# Patient Record
Sex: MALE | Race: WHITE | Employment: FULL TIME | URBAN - METROPOLITAN AREA
[De-identification: names, ages, dates, MRNs, and addresses within clinical notes are randomized per-mention and may not be internally consistent; named-entity substitution may affect disease eponyms.]

---

## 2018-09-03 ENCOUNTER — HOSPITAL ENCOUNTER (EMERGENCY)
Age: 48
Discharge: HOME | End: 2018-09-03
Payer: COMMERCIAL

## 2018-09-03 VITALS
TEMPERATURE: 98 F | HEIGHT: 70 IN | SYSTOLIC BLOOD PRESSURE: 135 MMHG | RESPIRATION RATE: 16 BRPM | HEART RATE: 82 BPM | BODY MASS INDEX: 30.78 KG/M2 | WEIGHT: 215 LBS | OXYGEN SATURATION: 96 % | DIASTOLIC BLOOD PRESSURE: 74 MMHG

## 2018-09-03 DIAGNOSIS — S39.012A BACK STRAIN, INITIAL ENCOUNTER: Primary | ICD-10-CM

## 2018-09-03 PROCEDURE — 96372 THER/PROPH/DIAG INJ SC/IM: CPT | Performed by: NURSE PRACTITIONER

## 2018-09-03 PROCEDURE — 99283 EMERGENCY DEPT VISIT LOW MDM: CPT | Performed by: NURSE PRACTITIONER

## 2018-09-03 RX ORDER — NAPROXEN 500 MG/1
500 TABLET ORAL 2 TIMES DAILY PRN
Qty: 20 TABLET | Refills: 0 | Status: SHIPPED | OUTPATIENT
Start: 2018-09-03 | End: 2018-09-10

## 2018-09-03 RX ORDER — METHOCARBAMOL 500 MG/1
500 TABLET, FILM COATED ORAL 3 TIMES DAILY PRN
Qty: 15 TABLET | Refills: 0 | Status: SHIPPED | OUTPATIENT
Start: 2018-09-03 | End: 2019-11-15

## 2018-09-03 RX ORDER — KETOROLAC TROMETHAMINE 30 MG/ML
60 INJECTION, SOLUTION INTRAMUSCULAR; INTRAVENOUS ONCE
Status: COMPLETED | OUTPATIENT
Start: 2018-09-03 | End: 2018-09-03

## 2018-09-03 NOTE — ED PROVIDER NOTES
Patient Seen in: [facility] [place]    History   Patient presents with:  Back Pain (musculoskeletal)    Stated Complaint: lower back pain    HPI  Patient is a 26-year-old gentleman that presents with right lumbar discomfort last ev Pulmonary/Chest: Effort normal.   Abdominal: Soft. Bowel sounds are normal. He exhibits no distension and no mass. There is no tenderness. There is no rebound and no guarding.    Musculoskeletal:    Back / Hip exam  Inspection: No ecchymosis, swelling, er Tab  Take 1 tablet (500 mg total) by mouth 3 (three) times daily as needed (back spasm). , Normal, Disp-15 tablet, R-0    naproxen 500 MG Oral Tab  Take 1 tablet (500 mg total) by mouth 2 (two) times daily as needed. , Print Script, Disp-20 tablet, R-0

## 2018-09-03 NOTE — ED INITIAL ASSESSMENT (HPI)
Pt was lifting heavy speakers on Saturday, c/o lower back pain since  Ibuprofen and cyclobenzaprine taken yesterday with minimal relief

## 2020-02-14 ENCOUNTER — HOSPITAL ENCOUNTER (OUTPATIENT)
Dept: RESPIRATORY THERAPY | Age: 50
Discharge: HOME | End: 2020-02-14
Attending: INTERNAL MEDICINE

## 2020-02-14 PROCEDURE — 95012 NITRIC OXIDE EXP GAS DETER: CPT

## 2020-02-14 PROCEDURE — 94070 EVALUATION OF WHEEZING: CPT

## 2020-02-14 RX ORDER — ALBUTEROL SULFATE 2.5 MG/3ML
SOLUTION RESPIRATORY (INHALATION)
Status: DISCONTINUED
Start: 2020-02-14 | End: 2020-02-15 | Stop reason: HOSPADM

## 2021-12-20 ENCOUNTER — LAB SERVICES (OUTPATIENT)
Dept: URGENT CARE | Age: 51
End: 2021-12-20

## 2021-12-20 DIAGNOSIS — Z01.812 ENCOUNTER FOR SCREENING LABORATORY TESTING FOR COVID-19 VIRUS IN ASYMPTOMATIC PATIENT: Primary | ICD-10-CM

## 2021-12-20 DIAGNOSIS — Z11.52 ENCOUNTER FOR SCREENING LABORATORY TESTING FOR COVID-19 VIRUS IN ASYMPTOMATIC PATIENT: Primary | ICD-10-CM

## 2021-12-20 PROCEDURE — U0003 INFECTIOUS AGENT DETECTION BY NUCLEIC ACID (DNA OR RNA); SEVERE ACUTE RESPIRATORY SYNDROME CORONAVIRUS 2 (SARS-COV-2) (CORONAVIRUS DISEASE [COVID-19]), AMPLIFIED PROBE TECHNIQUE, MAKING USE OF HIGH THROUGHPUT TECHNOLOGIES AS DESCRIBED BY CMS-2020-01-R: HCPCS | Performed by: PSYCHIATRY & NEUROLOGY

## 2021-12-20 PROCEDURE — U0005 INFEC AGEN DETEC AMPLI PROBE: HCPCS | Performed by: PSYCHIATRY & NEUROLOGY

## 2021-12-21 LAB
SARS-COV-2 RNA RESP QL NAA+PROBE: NOT DETECTED
SERVICE CMNT-IMP: NORMAL
SERVICE CMNT-IMP: NORMAL

## 2023-10-10 ENCOUNTER — WALK IN (OUTPATIENT)
Dept: URGENT CARE | Age: 53
End: 2023-10-10

## 2023-10-10 VITALS
OXYGEN SATURATION: 100 % | BODY MASS INDEX: 33.64 KG/M2 | DIASTOLIC BLOOD PRESSURE: 84 MMHG | HEART RATE: 80 BPM | HEIGHT: 70 IN | WEIGHT: 235 LBS | SYSTOLIC BLOOD PRESSURE: 138 MMHG | TEMPERATURE: 97.9 F

## 2023-10-10 DIAGNOSIS — H10.13 ACUTE ALLERGIC CONJUNCTIVITIS OF BOTH EYES: ICD-10-CM

## 2023-10-10 DIAGNOSIS — H10.9 BACTERIAL CONJUNCTIVITIS: Primary | ICD-10-CM

## 2023-10-10 PROCEDURE — 99203 OFFICE O/P NEW LOW 30 MIN: CPT | Performed by: NURSE PRACTITIONER

## 2023-10-10 RX ORDER — ROSUVASTATIN CALCIUM 5 MG/1
TABLET, COATED ORAL
COMMUNITY
Start: 2023-08-31

## 2023-10-10 RX ORDER — DULOXETIN HYDROCHLORIDE 60 MG/1
CAPSULE, DELAYED RELEASE ORAL
COMMUNITY
Start: 2023-08-31

## 2023-10-10 RX ORDER — OLOPATADINE HYDROCHLORIDE 1 MG/ML
1 SOLUTION/ DROPS OPHTHALMIC 2 TIMES DAILY
Qty: 5 ML | Refills: 12 | Status: SHIPPED | OUTPATIENT
Start: 2023-10-10 | End: 2023-11-09

## 2023-10-10 RX ORDER — POLYMYXIN B SULFATE AND TRIMETHOPRIM 1; 10000 MG/ML; [USP'U]/ML
1 SOLUTION OPHTHALMIC EVERY 4 HOURS
Qty: 10 ML | Refills: 0 | Status: SHIPPED | OUTPATIENT
Start: 2023-10-10 | End: 2023-10-17

## 2023-10-10 RX ORDER — CETIRIZINE HYDROCHLORIDE 10 MG/1
10 TABLET ORAL DAILY
COMMUNITY

## 2024-04-25 ENCOUNTER — OFFICE VISIT (OUTPATIENT)
Dept: INTERNAL MEDICINE CLINIC | Facility: CLINIC | Age: 54
End: 2024-04-25
Payer: COMMERCIAL

## 2024-04-25 VITALS
SYSTOLIC BLOOD PRESSURE: 132 MMHG | HEIGHT: 69.49 IN | BODY MASS INDEX: 34.6 KG/M2 | HEART RATE: 94 BPM | OXYGEN SATURATION: 96 % | RESPIRATION RATE: 18 BRPM | WEIGHT: 239 LBS | TEMPERATURE: 97 F | DIASTOLIC BLOOD PRESSURE: 86 MMHG

## 2024-04-25 DIAGNOSIS — Z13.29 THYROID DISORDER SCREEN: ICD-10-CM

## 2024-04-25 DIAGNOSIS — E66.9 OBESITY (BMI 30-39.9): ICD-10-CM

## 2024-04-25 DIAGNOSIS — Z12.5 PROSTATE CANCER SCREENING: ICD-10-CM

## 2024-04-25 DIAGNOSIS — F41.9 ANXIETY: ICD-10-CM

## 2024-04-25 DIAGNOSIS — E78.00 HYPERCHOLESTEROLEMIA: Primary | ICD-10-CM

## 2024-04-25 DIAGNOSIS — Z23 NEED FOR VACCINATION: ICD-10-CM

## 2024-04-25 DIAGNOSIS — Z13.0 SCREENING FOR DEFICIENCY ANEMIA: ICD-10-CM

## 2024-04-25 DIAGNOSIS — Z13.228 SCREENING FOR METABOLIC DISORDER: ICD-10-CM

## 2024-04-25 DIAGNOSIS — Z13.21 ENCOUNTER FOR VITAMIN DEFICIENCY SCREENING: ICD-10-CM

## 2024-04-25 PROCEDURE — 96127 BRIEF EMOTIONAL/BEHAV ASSMT: CPT | Performed by: FAMILY MEDICINE

## 2024-04-25 PROCEDURE — 90750 HZV VACC RECOMBINANT IM: CPT | Performed by: FAMILY MEDICINE

## 2024-04-25 PROCEDURE — 99203 OFFICE O/P NEW LOW 30 MIN: CPT | Performed by: FAMILY MEDICINE

## 2024-04-25 PROCEDURE — 90471 IMMUNIZATION ADMIN: CPT | Performed by: FAMILY MEDICINE

## 2024-04-25 RX ORDER — ROSUVASTATIN CALCIUM 5 MG/1
5 TABLET, COATED ORAL NIGHTLY
Qty: 90 TABLET | Refills: 3 | Status: SHIPPED | OUTPATIENT
Start: 2024-04-25

## 2024-04-25 RX ORDER — DULOXETIN HYDROCHLORIDE 60 MG/1
60 CAPSULE, DELAYED RELEASE ORAL DAILY
Qty: 90 CAPSULE | Refills: 0 | Status: SHIPPED | OUTPATIENT
Start: 2024-04-25

## 2024-04-25 RX ORDER — ROSUVASTATIN CALCIUM 5 MG/1
TABLET, COATED ORAL
COMMUNITY
Start: 2023-01-18 | End: 2024-04-25

## 2024-04-25 NOTE — PROGRESS NOTES
Jean Pierre Toro  8/21/1970    Chief Complaint   Patient presents with    New Patient    [facility]              HPI:   Jean Pierre Toro is a 53 year old male who presents to [facility]. He is on Crestor 5 mg nightly for HLD and Duloxetine for anxiety. He is engaged and currently looking into purchasing a home. He works as a  and also plays drums. He has been working to cut back on the amount of alcohol he drinks because of his father's history.     Current Outpatient Medications   Medication Sig Dispense Refill    DULoxetine 60 MG Oral Cap DR Particles Take 1 capsule (60 mg total) by mouth daily. 90 capsule 0    rosuvastatin 5 MG Oral Tab Take 1 tablet (5 mg total) by mouth nightly. 90 tablet 3    Albuterol Sulfate (PROAIR RESPICLICK) 108 (90 Base) MCG/ACT Inhalation Aerosol Powder, Breath Activated Inhale 2 puffs into the lungs every 4 to 6 hours as needed. 1 each 5      Allergies   Allergen Reactions    Seasonal Runny nose    Uncaria Tomentosa, Cats Claw ITCHING      Past Medical History:    Chronic pansinusitis    Esophageal reflux    Gastroesophageal reflux disease without esophagitis    History of colon polyps    on 4 colonoscopies    Hypercholesterolemia    Obstructive apnea    [facility] HST AHI 6 supine  AHI 22 SaO2 moise 85 %    Sleep apnea    Venous insufficiency of left lower extremity, deep on U/S      Patient Active Problem List   Diagnosis    Obesity (BMI 30-39.9)    Hypercholesterolemia    Gastroesophageal reflux disease without esophagitis    MARTA (obstructive sleep apnea), PSG 12/2019    Anxiety    Chronic pansinusitis    Venous insufficiency of left lower extremity, deep on U/S      Past Surgical History:   Procedure Laterality Date    Colonoscopy  2018    +polyps    Egd N/A 12/15/2021    Procedure: ESOPHAGOGASTRODUODENOSCOPY, COLONOSCOPY,;  Surgeon: Vinicio Victoria MD;  Location: [facility], [facility]      Family History   Problem Relation Age of Onset    Other (brain aneurysm)  Mother     Dementia Mother     Stroke Mother     Neurological Disorder Father     Alcohol abuse Father     Colon Cancer Maternal Grandfather     Colon Cancer Maternal Aunt 36    Other (brain aneurysm) Maternal Aunt       Social History     Socioeconomic History    Marital status: Single   Tobacco Use    Smoking status: Never    Smokeless tobacco: Never   Vaping Use    Vaping status: Never Used   Substance and Sexual Activity    Alcohol use: Yes     Alcohol/week: 3.0 - 4.0 standard drinks of alcohol     Types: 3 - 4 Cans of beer per week    Drug use: Yes     Frequency: 2.0 times per week     Types: Cannabis         REVIEW OF SYSTEMS:   GENERAL: feels well otherwise, no recent illness.     EXAM:   /86   Pulse 94   Temp 97.4 °F (36.3 °C)   Resp 18   Ht 5' 9.49\" (1.765 m)   Wt 239 lb (108.4 kg)   SpO2 96%   BMI 34.80 kg/m²   GENERAL: Well developed, well nourished,in no apparent distress  SKIN: No rash  EYES: PERRLA, EOMI, conjunctiva are clear  HEENT: atraumatic, normocephalic  LUNGS: normal work of breathing  CARDIO: Regular rate    ASSESSMENT AND PLAN:   Jean Pierre Toro is a 53 year old male who presents to establish care    Hypercholesterolemia  Continue statin. Will check lipid panel.   - Lipid Panel; Future  - rosuvastatin 5 MG Oral Tab; Take 1 tablet (5 mg total) by mouth nightly.  Dispense: 90 tablet; Refill: 3    Anxiety  Overall controlled on treatment. Considering tapering off at some point.   - DULoxetine 60 MG Oral Cap DR Particles; Take 1 capsule (60 mg total) by mouth daily.  Dispense: 90 capsule; Refill: 0    Need for vaccination  - Zoster Recombinant Adjuvanted (Shingrix -Shingles) [04634]    Screening Labs  - Comp Metabolic Panel (14); Future  - Vitamin D, 25-Hydroxy; Future  - PSA Screen; Future  - CBC With Differential With Platelet; Future  - TSH W Reflex To Free T4; Future    Return in 4 weeks (on 5/23/2024).  There are no Patient Instructions on file for this visit.    All questions  were answered and the patient agrees with the plan.     Thank you,  Jean Pierre Lord MD

## 2024-05-07 ENCOUNTER — LAB ENCOUNTER (OUTPATIENT)
Dept: LAB | Age: 54
End: 2024-05-07
Attending: FAMILY MEDICINE
Payer: COMMERCIAL

## 2024-05-07 DIAGNOSIS — Z12.5 PROSTATE CANCER SCREENING: ICD-10-CM

## 2024-05-07 DIAGNOSIS — E78.00 HYPERCHOLESTEROLEMIA: ICD-10-CM

## 2024-05-07 DIAGNOSIS — Z13.228 SCREENING FOR METABOLIC DISORDER: ICD-10-CM

## 2024-05-07 DIAGNOSIS — Z13.29 THYROID DISORDER SCREEN: ICD-10-CM

## 2024-05-07 DIAGNOSIS — Z13.21 ENCOUNTER FOR VITAMIN DEFICIENCY SCREENING: ICD-10-CM

## 2024-05-07 DIAGNOSIS — Z13.0 SCREENING FOR DEFICIENCY ANEMIA: ICD-10-CM

## 2024-05-07 LAB
ALBUMIN SERPL-MCNC: 4.6 G/DL (ref 3.2–4.8)
ALBUMIN/GLOB SERPL: 1.6 {RATIO} (ref 1–2)
ALP LIVER SERPL-CCNC: 50 U/L
ALT SERPL-CCNC: 26 U/L
ANION GAP SERPL CALC-SCNC: 5 MMOL/L (ref 0–18)
AST SERPL-CCNC: 24 U/L (ref ?–34)
BASOPHILS # BLD AUTO: 0.04 X10(3) UL (ref 0–0.2)
BASOPHILS NFR BLD AUTO: 0.8 %
BILIRUB SERPL-MCNC: 0.8 MG/DL (ref 0.3–1.2)
BUN BLD-MCNC: 11 MG/DL (ref 9–23)
BUN/CREAT SERPL: 9.7 (ref 10–20)
CALCIUM BLD-MCNC: 9.8 MG/DL (ref 8.7–10.4)
CHLORIDE SERPL-SCNC: 105 MMOL/L (ref 98–112)
CHOLEST SERPL-MCNC: 183 MG/DL (ref ?–200)
CO2 SERPL-SCNC: 29 MMOL/L (ref 21–32)
COMPLEXED PSA SERPL-MCNC: 0.99 NG/ML (ref ?–4)
CREAT BLD-MCNC: 1.13 MG/DL
DEPRECATED RDW RBC AUTO: 42.4 FL (ref 35.1–46.3)
EGFRCR SERPLBLD CKD-EPI 2021: 78 ML/MIN/1.73M2 (ref 60–?)
EOSINOPHIL # BLD AUTO: 0.2 X10(3) UL (ref 0–0.7)
EOSINOPHIL NFR BLD AUTO: 4.2 %
ERYTHROCYTE [DISTWIDTH] IN BLOOD BY AUTOMATED COUNT: 12.5 % (ref 11–15)
FASTING PATIENT LIPID ANSWER: YES
FASTING STATUS PATIENT QL REPORTED: YES
GLOBULIN PLAS-MCNC: 2.8 G/DL (ref 2–3.5)
GLUCOSE BLD-MCNC: 101 MG/DL (ref 70–99)
HCT VFR BLD AUTO: 46.3 %
HDLC SERPL-MCNC: 56 MG/DL (ref 40–59)
HGB BLD-MCNC: 15.8 G/DL
IMM GRANULOCYTES # BLD AUTO: 0.02 X10(3) UL (ref 0–1)
IMM GRANULOCYTES NFR BLD: 0.4 %
LDLC SERPL CALC-MCNC: 106 MG/DL (ref ?–100)
LYMPHOCYTES # BLD AUTO: 1.18 X10(3) UL (ref 1–4)
LYMPHOCYTES NFR BLD AUTO: 25 %
MCH RBC QN AUTO: 31.7 PG (ref 26–34)
MCHC RBC AUTO-ENTMCNC: 34.1 G/DL (ref 31–37)
MCV RBC AUTO: 93 FL
MONOCYTES # BLD AUTO: 0.36 X10(3) UL (ref 0.1–1)
MONOCYTES NFR BLD AUTO: 7.6 %
NEUTROPHILS # BLD AUTO: 2.92 X10 (3) UL (ref 1.5–7.7)
NEUTROPHILS # BLD AUTO: 2.92 X10(3) UL (ref 1.5–7.7)
NEUTROPHILS NFR BLD AUTO: 62 %
NONHDLC SERPL-MCNC: 127 MG/DL (ref ?–130)
OSMOLALITY SERPL CALC.SUM OF ELEC: 288 MOSM/KG (ref 275–295)
PLATELET # BLD AUTO: 250 10(3)UL (ref 150–450)
POTASSIUM SERPL-SCNC: 4.9 MMOL/L (ref 3.5–5.1)
PROT SERPL-MCNC: 7.4 G/DL (ref 5.7–8.2)
RBC # BLD AUTO: 4.98 X10(6)UL
SODIUM SERPL-SCNC: 139 MMOL/L (ref 136–145)
TRIGL SERPL-MCNC: 119 MG/DL (ref 30–149)
TSI SER-ACNC: 1.95 MIU/ML (ref 0.55–4.78)
VIT D+METAB SERPL-MCNC: 19.9 NG/ML (ref 30–100)
VLDLC SERPL CALC-MCNC: 20 MG/DL (ref 0–30)
WBC # BLD AUTO: 4.7 X10(3) UL (ref 4–11)

## 2024-05-07 PROCEDURE — 80061 LIPID PANEL: CPT

## 2024-05-07 PROCEDURE — 82306 VITAMIN D 25 HYDROXY: CPT

## 2024-05-07 PROCEDURE — 85025 COMPLETE CBC W/AUTO DIFF WBC: CPT

## 2024-05-07 PROCEDURE — 80053 COMPREHEN METABOLIC PANEL: CPT

## 2024-05-07 PROCEDURE — 36415 COLL VENOUS BLD VENIPUNCTURE: CPT

## 2024-05-07 PROCEDURE — 84443 ASSAY THYROID STIM HORMONE: CPT

## 2024-05-09 ENCOUNTER — OFFICE VISIT (OUTPATIENT)
Dept: INTERNAL MEDICINE CLINIC | Facility: CLINIC | Age: 54
End: 2024-05-09
Payer: COMMERCIAL

## 2024-05-09 VITALS
TEMPERATURE: 98 F | HEIGHT: 69.29 IN | OXYGEN SATURATION: 96 % | RESPIRATION RATE: 16 BRPM | WEIGHT: 240.38 LBS | SYSTOLIC BLOOD PRESSURE: 126 MMHG | HEART RATE: 92 BPM | DIASTOLIC BLOOD PRESSURE: 74 MMHG | BODY MASS INDEX: 35.2 KG/M2

## 2024-05-09 DIAGNOSIS — E55.9 VITAMIN D DEFICIENCY: ICD-10-CM

## 2024-05-09 DIAGNOSIS — F41.9 ANXIETY: ICD-10-CM

## 2024-05-09 DIAGNOSIS — E66.9 OBESITY (BMI 30-39.9): ICD-10-CM

## 2024-05-09 DIAGNOSIS — Z00.00 WELLNESS EXAMINATION: Primary | ICD-10-CM

## 2024-05-09 DIAGNOSIS — E78.00 HYPERCHOLESTEROLEMIA: ICD-10-CM

## 2024-05-09 PROCEDURE — 99396 PREV VISIT EST AGE 40-64: CPT | Performed by: FAMILY MEDICINE

## 2024-05-09 RX ORDER — ERGOCALCIFEROL 1.25 MG/1
50000 CAPSULE ORAL WEEKLY
Qty: 12 CAPSULE | Refills: 0 | Status: SHIPPED | OUTPATIENT
Start: 2024-05-09

## 2024-05-09 NOTE — PROGRESS NOTES
Jean Pierre Toro  8/21/1970    Chief Complaint   Patient presents with    Physical     ES rm - 8 - Physical       HPI:   Jean Pierre Toro is a 53 year old male who presents for CPE.    Current Outpatient Medications   Medication Sig Dispense Refill    ergocalciferol 1.25 MG (83192 UT) Oral Cap Take 1 capsule (50,000 Units total) by mouth once a week. 12 capsule 0    DULoxetine 60 MG Oral Cap DR Particles Take 1 capsule (60 mg total) by mouth daily. 90 capsule 0    rosuvastatin 5 MG Oral Tab Take 1 tablet (5 mg total) by mouth nightly. 90 tablet 3    Albuterol Sulfate (PROAIR RESPICLICK) 108 (90 Base) MCG/ACT Inhalation Aerosol Powder, Breath Activated Inhale 2 puffs into the lungs every 4 to 6 hours as needed. 1 each 5      Allergies   Allergen Reactions    Seasonal Runny nose    Uncaria Tomentosa, Cats Claw ITCHING      Past Medical History:    Chronic pansinusitis    Esophageal reflux    Gastroesophageal reflux disease without esophagitis    History of colon polyps    on 4 colonoscopies    Hypercholesterolemia    Obstructive apnea    [facility] HST AHI 6 supine  AHI 22 SaO2 moise 85 %    Sleep apnea    Venous insufficiency of left lower extremity, deep on U/S      Patient Active Problem List   Diagnosis    Obesity (BMI 30-39.9)    Hypercholesterolemia    Gastroesophageal reflux disease without esophagitis    MARTA (obstructive sleep apnea), PSG 12/2019    Anxiety    Chronic pansinusitis    Venous insufficiency of left lower extremity, deep on U/S      Past Surgical History:   Procedure Laterality Date    Colonoscopy  2018    +polyps    Egd N/A 12/15/2021    Procedure: ESOPHAGOGASTRODUODENOSCOPY, COLONOSCOPY,;  Surgeon: Vinicio Victoria MD;  Location: [facility], [facility]      Family History   Problem Relation Age of Onset    Other (brain aneurysm) Mother     Dementia Mother     Stroke Mother     Neurological Disorder Father     Alcohol abuse Father     Colon Cancer Maternal Grandfather     Colon Cancer Maternal Aunt 36     Other (brain aneurysm) Maternal Aunt       Social History     Socioeconomic History    Marital status: Single   Tobacco Use    Smoking status: Never    Smokeless tobacco: Never   Vaping Use    Vaping status: Never Used   Substance and Sexual Activity    Alcohol use: Yes     Alcohol/week: 3.0 - 4.0 standard drinks of alcohol     Types: 3 - 4 Cans of beer per week    Drug use: Yes     Frequency: 2.0 times per week     Types: Cannabis         REVIEW OF SYSTEMS:   GENERAL: feels well otherwise  SKIN: no rash  EYES: no vision changes  HEENT: not congested  LUNGS: no dyspnea  CARDIOVASCULAR: no angina   GI: no new abdominal pain    EXAM:   /74 (BP Location: Left arm, Patient Position: Sitting, Cuff Size: large)   Pulse 92   Temp 97.6 °F (36.4 °C) (Temporal)   Resp 16   Ht 5' 9.29\" (1.76 m)   Wt 240 lb 6.4 oz (109 kg)   SpO2 96%   BMI 35.20 kg/m²   GENERAL: Well developed, well nourished,in no apparent distress  SKIN: No rash  EYES: PERRLA, EOMI, conjunctiva are clear  HEENT: atraumatic, normocephalic  LUNGS: clear to auscultation  CARDIO: RRR without murmur  GI: soft, NT    ASSESSMENT AND PLAN:   Jean Pierre Toro is a 53 year old male who presents for CPE    Wellness examination  Discussed age appropriate health and wellness.     Hypercholesterolemia  Obesity  Will continue current statin dosing and discussed DASH diet. Will repeat lipid panel prior to follow-up in 6 months.   - Lipid Panel; Future    Anxiety  Overall stable on current treatment. Would like to continue for now and readdress at his next follow-up    Vitamin D deficiency  Will begin 12 weeks of high dose therapy followed by daily D3 supplementation. Repeat vit D level in 6 months prior to follow-up  - Vitamin D [E]; Future  - ergocalciferol 1.25 MG (48425 UT) Oral Cap; Take 1 capsule (50,000 Units total) by mouth once a week.  Dispense: 12 capsule; Refill: 0      Return in 6 months (on 11/9/2024).  There are no Patient Instructions on file for  this visit.    All questions were answered and the patient agrees with the plan.     Thank you,  Jean Pierre Lord MD

## 2024-07-04 DIAGNOSIS — F41.9 ANXIETY: ICD-10-CM

## 2024-07-09 RX ORDER — DULOXETIN HYDROCHLORIDE 60 MG/1
60 CAPSULE, DELAYED RELEASE ORAL DAILY
Qty: 90 CAPSULE | Refills: 3 | Status: SHIPPED | OUTPATIENT
Start: 2024-07-09

## 2024-07-09 NOTE — TELEPHONE ENCOUNTER
Refill Per Protocol     Requested Prescriptions   Pending Prescriptions Disp Refills    DULOXETINE 60 MG Oral Cap DR Particles [Pharmacy Med Name: DULOXETINE HCL DR CAPS 60MG] 90 capsule 3     Sig: TAKE 1 CAPSULE DAILY       Psychiatric Non-Scheduled (Anti-Anxiety) Passed - 7/4/2024 11:48 PM        Passed - In person appointment or virtual visit in the past 6 mos or appointment in next 3 mos     Recent Outpatient Visits              2 months ago [address] examination    [address]    Office Visit    2 months ago Hypercholesterolemia    [address]    Office Visit    2 years ago Annual physical exam    [facility] - [address], [place], Marcin, MD    Office Visit    2 years ago Family history of colon cancer    [facility]ology - [place], Vinicio Marcial MD    Office Visit    2 years ago Fever, unspecified fever cause    [facility] [address], Blake Rojas DO    Office Visit          Future Appointments         Provider Department Appt Notes    In 4 months Jean Pierre Lord MD [address] 6 mo fup                    Passed - Depression Screening completed within the past 12 months               Future Appointments         Provider Department Appt Notes    In 4 months Jean Pierre Lord MD [address] 6 mo fup          Recent Outpatient Visits              2 months ago Wellness examination    [address]    Office Visit    2 months ago Hypercholesterolemia    [address]    Office Visit    2 years ago Annual physical exam    [facility] - [address], [place], Marcin, MD    Office Visit    2 years ago Family history of colon cancer     [facility] - [place] , Vinicio Marcial MD    Office Visit    2 years ago Fever, unspecified fever cause    [facility] [address], Blake Rojas, DO    Office Visit

## 2024-07-25 DIAGNOSIS — E55.9 VITAMIN D DEFICIENCY: ICD-10-CM

## 2024-07-29 RX ORDER — ERGOCALCIFEROL 1.25 MG/1
50000 CAPSULE, LIQUID FILLED ORAL WEEKLY
Qty: 12 CAPSULE | Refills: 0 | OUTPATIENT
Start: 2024-07-29

## 2024-07-29 NOTE — TELEPHONE ENCOUNTER
Please review. Protocol Failed; No Protocol        Component  Ref Range & Units 5/7/24  8:01 AM   Vitamin D, 25OH, Total  30.0 - 100.0 ng/mL 19.9 Low         Message sent to patient to complete labs     Requested Prescriptions   Pending Prescriptions Disp Refills    ERGOCALCIFEROL 1.25 MG (03249 UT) Oral Cap [Pharmacy Med Name: Vitamin D 50,000 Unit Cap Bion] 12 capsule 0     Sig: Take 1 capsule by mouth once a week.       There is no refill protocol information for this order            Future Appointments         Provider Department Appt Notes    In 3 months Jean Pierre Lord MD [address] 6 mo fup          Recent Outpatient Visits              2 months ago Wellness examination    [address] Jean Pierre Lord MD    Office Visit    3 months ago Hypercholesterolemia    [address] Jean Pierre Lord MD    Office Visit    2 years ago Annual physical exam    [facility] - [address], [place]er, Marcin, MD    Office Visit    2 years ago Family history of colon cancer    [facility] - [place], Vinicio Marcial MD    Office Visit    2 years ago Fever, unspecified fever cause    [facility] [address], Blake Rojas,     Office Visit

## 2024-07-29 NOTE — TELEPHONE ENCOUNTER
He should be completing 12 week course, then transition to daily vit D3 2000 international units daily.

## 2024-08-08 NOTE — TELEPHONE ENCOUNTER
Called and spoke to pt. Advised to transition to OTC vit D3 2000IU daily. Patient stated understanding and agreed to plan.

## 2024-08-14 ENCOUNTER — TELEPHONE (OUTPATIENT)
Dept: INTERNAL MEDICINE CLINIC | Facility: CLINIC | Age: 54
End: 2024-08-14

## 2024-08-14 NOTE — TELEPHONE ENCOUNTER
Received fax stating patient having surgery on 8/30 for septoplasty with Dr. J Carlos Agee  ADWOA to schedule.  Placing paperwork in blue folder.    Dr. Agee's office number is [phone] and fax number is [phone]

## 2024-08-14 NOTE — TELEPHONE ENCOUNTER
Hans sleep study. J Carlos Agee MD received 8/14/24. Note placed in 's desk for review. Attached a barcode to be sent to scanning.

## 2024-08-14 NOTE — TELEPHONE ENCOUNTER
Future Appointments   Date Time Provider Department Center   8/23/2024  2:00 PM Jean Pierre Lord MD [facility]   11/14/2024  8:00 AM Jean Pierre Lord MD [facility]     Paperwork in Red folder

## 2024-08-22 ENCOUNTER — LAB ENCOUNTER (OUTPATIENT)
Dept: LAB | Age: 54
End: 2024-08-22
Attending: FAMILY MEDICINE
Payer: COMMERCIAL

## 2024-08-22 DIAGNOSIS — G47.33 OBSTRUCTIVE SLEEP APNEA (ADULT) (PEDIATRIC): Primary | ICD-10-CM

## 2024-08-22 LAB
ANION GAP SERPL CALC-SCNC: 2 MMOL/L (ref 0–18)
BASOPHILS # BLD AUTO: 0.05 X10(3) UL (ref 0–0.2)
BASOPHILS NFR BLD AUTO: 0.9 %
BUN BLD-MCNC: 15 MG/DL (ref 9–23)
CALCIUM BLD-MCNC: 9.5 MG/DL (ref 8.7–10.4)
CHLORIDE SERPL-SCNC: 104 MMOL/L (ref 98–112)
CO2 SERPL-SCNC: 30 MMOL/L (ref 21–32)
CREAT BLD-MCNC: 1.12 MG/DL
EGFRCR SERPLBLD CKD-EPI 2021: 78 ML/MIN/1.73M2 (ref 60–?)
EOSINOPHIL # BLD AUTO: 0.28 X10(3) UL (ref 0–0.7)
EOSINOPHIL NFR BLD AUTO: 5 %
ERYTHROCYTE [DISTWIDTH] IN BLOOD BY AUTOMATED COUNT: 12.5 %
FASTING STATUS PATIENT QL REPORTED: YES
GLUCOSE BLD-MCNC: 106 MG/DL (ref 70–99)
HCT VFR BLD AUTO: 44.2 %
HGB BLD-MCNC: 15.5 G/DL
IMM GRANULOCYTES # BLD AUTO: 0.02 X10(3) UL (ref 0–1)
IMM GRANULOCYTES NFR BLD: 0.4 %
LYMPHOCYTES # BLD AUTO: 1.15 X10(3) UL (ref 1–4)
LYMPHOCYTES NFR BLD AUTO: 20.5 %
MCH RBC QN AUTO: 32.2 PG (ref 26–34)
MCHC RBC AUTO-ENTMCNC: 35.1 G/DL (ref 31–37)
MCV RBC AUTO: 91.9 FL
MONOCYTES # BLD AUTO: 0.5 X10(3) UL (ref 0.1–1)
MONOCYTES NFR BLD AUTO: 8.9 %
NEUTROPHILS # BLD AUTO: 3.6 X10 (3) UL (ref 1.5–7.7)
NEUTROPHILS # BLD AUTO: 3.6 X10(3) UL (ref 1.5–7.7)
NEUTROPHILS NFR BLD AUTO: 64.3 %
OSMOLALITY SERPL CALC.SUM OF ELEC: 283 MOSM/KG (ref 275–295)
PLATELET # BLD AUTO: 232 10(3)UL (ref 150–450)
POTASSIUM SERPL-SCNC: 4.2 MMOL/L (ref 3.5–5.1)
RBC # BLD AUTO: 4.81 X10(6)UL
SODIUM SERPL-SCNC: 136 MMOL/L (ref 136–145)
WBC # BLD AUTO: 5.6 X10(3) UL (ref 4–11)

## 2024-08-22 PROCEDURE — 80048 BASIC METABOLIC PNL TOTAL CA: CPT

## 2024-08-22 PROCEDURE — 85025 COMPLETE CBC W/AUTO DIFF WBC: CPT

## 2024-08-22 PROCEDURE — 36415 COLL VENOUS BLD VENIPUNCTURE: CPT

## 2024-08-23 ENCOUNTER — OFFICE VISIT (OUTPATIENT)
Dept: INTERNAL MEDICINE CLINIC | Facility: CLINIC | Age: 54
End: 2024-08-23
Payer: COMMERCIAL

## 2024-08-23 ENCOUNTER — TELEPHONE (OUTPATIENT)
Dept: INTERNAL MEDICINE CLINIC | Facility: CLINIC | Age: 54
End: 2024-08-23

## 2024-08-23 VITALS
HEART RATE: 88 BPM | DIASTOLIC BLOOD PRESSURE: 82 MMHG | RESPIRATION RATE: 18 BRPM | OXYGEN SATURATION: 97 % | BODY MASS INDEX: 34.24 KG/M2 | WEIGHT: 233.81 LBS | SYSTOLIC BLOOD PRESSURE: 116 MMHG | HEIGHT: 69.29 IN | TEMPERATURE: 97 F

## 2024-08-23 DIAGNOSIS — Z01.818 PREOPERATIVE EXAMINATION: Primary | ICD-10-CM

## 2024-08-23 DIAGNOSIS — J34.2 NASAL SEPTAL DEVIATION: ICD-10-CM

## 2024-08-23 DIAGNOSIS — G47.33 OSA (OBSTRUCTIVE SLEEP APNEA): ICD-10-CM

## 2024-08-23 DIAGNOSIS — E78.00 HYPERCHOLESTEROLEMIA: ICD-10-CM

## 2024-08-23 DIAGNOSIS — F41.9 ANXIETY: ICD-10-CM

## 2024-08-23 LAB
ATRIAL RATE: 67 BPM
P AXIS: 49 DEGREES
P-R INTERVAL: 156 MS
Q-T INTERVAL: 384 MS
QRS DURATION: 68 MS
QTC CALCULATION (BEZET): 405 MS
R AXIS: 28 DEGREES
T AXIS: 44 DEGREES
VENTRICULAR RATE: 67 BPM

## 2024-08-23 NOTE — TELEPHONE ENCOUNTER
Faxed EKG and H&P clearance to [phone] and ph - [phone].  Received confirmation.  Put in red folder.

## 2024-08-23 NOTE — PROGRESS NOTES
Jean Pierre Toro  8/21/1970    Chief Complaint   Patient presents with    Pre-Op Exam     ES rm - 1 -  8/30 Septoplasty with Dr. J Carlos Agee       HPI:   Jean Pierre Toro is a 54 year old male who presents for preoperative evaluation prior to his septoplasty, inferior turbinate reduction, and sleep endoscop scheduled for 8/30/2024. He completed his preoperative labs.  He has no new chest pain or dyspnea.  Adequate functional capacity.    Current Outpatient Medications   Medication Sig Dispense Refill    DULoxetine 60 MG Oral Cap DR Particles Take 1 capsule (60 mg total) by mouth daily. 90 capsule 3    rosuvastatin 5 MG Oral Tab Take 1 tablet (5 mg total) by mouth nightly. 90 tablet 3    ergocalciferol 1.25 MG (84044 UT) Oral Cap Take 1 capsule (50,000 Units total) by mouth once a week. (Patient not taking: Reported on 8/23/2024) 12 capsule 0    Albuterol Sulfate (PROAIR RESPICLICK) 108 (90 Base) MCG/ACT Inhalation Aerosol Powder, Breath Activated Inhale 2 puffs into the lungs every 4 to 6 hours as needed. (Patient not taking: Reported on 8/23/2024) 1 each 5      Allergies   Allergen Reactions    Seasonal Runny nose    Uncaria Tomentosa, Cats Claw ITCHING      Past Medical History:    Chronic pansinusitis    Esophageal reflux    Gastroesophageal reflux disease without esophagitis    History of colon polyps    on 4 colonoscopies    Hypercholesterolemia    Obstructive apnea    [facility] HST AHI 6 supine  AHI 22 SaO2 moise 85 %    Sleep apnea    Venous insufficiency of left lower extremity, deep on U/S      Patient Active Problem List   Diagnosis    Obesity (BMI 30-39.9)    Hypercholesterolemia    Gastroesophageal reflux disease without esophagitis    MARTA (obstructive sleep apnea), PSG 12/2019    Anxiety    Chronic pansinusitis    Venous insufficiency of left lower extremity, deep on U/S      Past Surgical History:   Procedure Laterality Date    Colonoscopy  2018    +polyps    Egd N/A 12/15/2021    Procedure:  ESOPHAGOGASTRODUODENOSCOPY, COLONOSCOPY,;  Surgeon: Vinicio Victoria MD;  Location: [facility], [facility]      Family History   Problem Relation Age of Onset    Other (brain aneurysm) Mother     Dementia Mother     Stroke Mother     Neurological Disorder Father     Alcohol abuse Father     Colon Cancer Maternal Grandfather     Colon Cancer Maternal Aunt 36    Other (brain aneurysm) Maternal Aunt       Social History     Socioeconomic History    Marital status: Single   Tobacco Use    Smoking status: Never    Smokeless tobacco: Never   Vaping Use    Vaping status: Never Used   Substance and Sexual Activity    Alcohol use: Yes     Alcohol/week: 3.0 - 4.0 standard drinks of alcohol     Types: 3 - 4 Cans of beer per week    Drug use: Yes     Frequency: 2.0 times per week     Types: Cannabis         REVIEW OF SYSTEMS:   GENERAL: feels well otherwise  SKIN: no rashes  EYES: no new vision changes  HEENT: see HPI  LUNGS: no new dyspnea  CARDIOVASCULAR: no new chest pain  GI: no new abdominal pain  NEURO: no headaches    EXAM:   /82 (BP Location: Left arm, Patient Position: Sitting, Cuff Size: large)   Pulse 88   Temp 97.4 °F (36.3 °C) (Temporal)   Resp 18   Ht 5' 9.29\" (1.76 m)   Wt 233 lb 12.8 oz (106.1 kg)   SpO2 97%   BMI 34.24 kg/m²   GENERAL: Well developed, well nourished,in no apparent distress  SKIN: No rashes,no suspicious lesions  EYES: PERRLA, EOMI, conjunctiva are clear  HEENT: atraumatic, normocephalic,ears and throat are clear  NECK: supple,no adenopathy,no bruits  LUNGS: clear to auscultation  CARDIO: RRR without murmur  GI: good BS's,no masses, HSM or tenderness    ASSESSMENT AND PLAN:   Jean Pierre Toro is a 54 year old male who presents for preoperative evaluation prior to his septoplasty, inferior turbinate reduction, and sleep endoscopy.  RCRI score 0. EKG in office shows NSR with new ischemic changes. He has no new chest pain or dyspnea.  Reviewed his stable preoperative labs.  We discussed  perioperative medication management.  He may proceed with his upcoming procedure at acceptable risk.    Preoperative examination  Nasal septal deviation  Hypercholesterolemia  Anxiety  MARTA (obstructive sleep apnea), PSG 12/2019  - ELECTROCARDIOGRAM, COMPLETE      All questions were answered and the patient agrees with the plan.     Thank you,  Jean Pierre Lord MD

## 2024-09-07 ENCOUNTER — TELEPHONE (OUTPATIENT)
Dept: INTERNAL MEDICINE CLINIC | Facility: CLINIC | Age: 54
End: 2024-09-07

## 2024-11-14 ENCOUNTER — TELEPHONE (OUTPATIENT)
Dept: INTERNAL MEDICINE CLINIC | Facility: CLINIC | Age: 54
End: 2024-11-14

## 2025-04-02 DIAGNOSIS — E78.00 HYPERCHOLESTEROLEMIA: ICD-10-CM

## 2025-04-04 RX ORDER — ROSUVASTATIN CALCIUM 5 MG/1
5 TABLET, COATED ORAL NIGHTLY
Qty: 90 TABLET | Refills: 3 | Status: SHIPPED | OUTPATIENT
Start: 2025-04-04

## 2025-04-04 NOTE — TELEPHONE ENCOUNTER
Refill Per Protocol     Requested Prescriptions   Pending Prescriptions Disp Refills    ROSUVASTATIN 5 MG Oral Tab [Pharmacy Med Name: ROSUVASTATIN TABS 5MG] 90 tablet 3     Sig: TAKE 1 TABLET NIGHTLY       Cholesterol Medication Protocol Passed - 4/4/2025 12:32 PM        Passed - ALT < 80     Lab Results   Component Value Date    ALT 26 05/07/2024             Passed - ALT resulted within past year        Passed - Lipid panel within past 12 months     Lab Results   Component Value Date    CHOLEST 183 05/07/2024    TRIG 119 05/07/2024    HDL 56 05/07/2024     (H) 05/07/2024    VLDL 20 05/07/2024    NONHDLC 127 05/07/2024             Passed - In person appointment or virtual visit in the past 12 mos or appointment in next 3 mos     Recent Outpatient Visits              7 months ago Preoperative examination    [facility], [address] [place], [place] [address]Tabitha Michael, MD    Office Visit    11 months ago Wellness examination    [address]    Office Visit    11 months ago Hypercholesterolemia    [address]    Office Visit    3 years ago Annual physical exam    [facility] - [address], [place], Marcin, MD    Office Visit    3 years ago Family history of colon cancer    [facility] - [place], Vinicio Marcial MD    Office Visit          Future Appointments         Provider Department Appt Notes    In 1 week  CT [address] Want to check heart score since I am on cholesterol medications                    Passed - Medication is active on med list

## 2025-04-10 ENCOUNTER — TELEPHONE (OUTPATIENT)
Dept: INTERNAL MEDICINE CLINIC | Facility: CLINIC | Age: 55
End: 2025-04-10

## 2025-04-10 DIAGNOSIS — Z13.29 SCREENING FOR ENDOCRINE, METABOLIC AND IMMUNITY DISORDER: ICD-10-CM

## 2025-04-10 DIAGNOSIS — Z13.220 SCREENING FOR LIPOID DISORDERS: ICD-10-CM

## 2025-04-10 DIAGNOSIS — Z13.29 SCREENING FOR THYROID DISORDER: ICD-10-CM

## 2025-04-10 DIAGNOSIS — Z13.0 SCREENING FOR ENDOCRINE, METABOLIC AND IMMUNITY DISORDER: ICD-10-CM

## 2025-04-10 DIAGNOSIS — Z13.228 SCREENING FOR ENDOCRINE, METABOLIC AND IMMUNITY DISORDER: ICD-10-CM

## 2025-04-10 DIAGNOSIS — Z13.0 SCREENING FOR BLOOD DISEASE: ICD-10-CM

## 2025-04-10 DIAGNOSIS — Z12.5 SPECIAL SCREENING FOR MALIGNANT NEOPLASM OF PROSTATE: ICD-10-CM

## 2025-04-10 DIAGNOSIS — Z00.00 ROUTINE GENERAL MEDICAL EXAMINATION AT A HEALTH CARE FACILITY: Primary | ICD-10-CM

## 2025-04-10 NOTE — TELEPHONE ENCOUNTER
Future Appointments   Date Time Provider Department Center   4/13/2025  2:00 PM PF CT RM1 PF [facility] [place]   5/8/2025  4:30 PM Jean Pierre Lord MD [facility]     Orders to  [place]    Pt informed that labs need to be completed no sooner than 2 weeks prior to the appt. Pt aware to fast-no call back required

## 2025-04-13 ENCOUNTER — HOSPITAL ENCOUNTER (OUTPATIENT)
Dept: CT IMAGING | Age: 55
Discharge: HOME | End: 2025-04-13
Attending: FAMILY MEDICINE

## 2025-04-13 DIAGNOSIS — Z13.9 SPECIAL SCREENING: ICD-10-CM

## 2025-04-15 ENCOUNTER — PATIENT MESSAGE (OUTPATIENT)
Dept: INTERNAL MEDICINE CLINIC | Facility: CLINIC | Age: 55
End: 2025-04-15

## 2025-04-15 NOTE — TELEPHONE ENCOUNTER
See 4/13/25 Imaging CT Calcium Scoring Over Read    Future Appointments         Provider Department Appt Notes    In 1 month Jean Pierre Lord MD [facility] [place], ???, Tabitha jaffe-last 5/9/24-Fatigue-

## 2025-05-13 ENCOUNTER — LAB ENCOUNTER (OUTPATIENT)
Dept: LAB | Age: 55
End: 2025-05-13
Attending: FAMILY MEDICINE
Payer: COMMERCIAL

## 2025-05-13 DIAGNOSIS — Z13.29 SCREENING FOR ENDOCRINE, METABOLIC AND IMMUNITY DISORDER: ICD-10-CM

## 2025-05-13 DIAGNOSIS — Z13.29 SCREENING FOR THYROID DISORDER: ICD-10-CM

## 2025-05-13 DIAGNOSIS — Z13.220 SCREENING FOR LIPOID DISORDERS: ICD-10-CM

## 2025-05-13 DIAGNOSIS — Z13.0 SCREENING FOR BLOOD DISEASE: ICD-10-CM

## 2025-05-13 DIAGNOSIS — Z00.00 ROUTINE GENERAL MEDICAL EXAMINATION AT A HEALTH CARE FACILITY: ICD-10-CM

## 2025-05-13 DIAGNOSIS — Z13.0 SCREENING FOR ENDOCRINE, METABOLIC AND IMMUNITY DISORDER: ICD-10-CM

## 2025-05-13 DIAGNOSIS — Z12.5 SPECIAL SCREENING FOR MALIGNANT NEOPLASM OF PROSTATE: ICD-10-CM

## 2025-05-13 DIAGNOSIS — Z13.228 SCREENING FOR ENDOCRINE, METABOLIC AND IMMUNITY DISORDER: ICD-10-CM

## 2025-05-13 LAB
ALBUMIN SERPL-MCNC: 4.7 G/DL (ref 3.2–4.8)
ALBUMIN/GLOB SERPL: 2 {RATIO} (ref 1–2)
ALP LIVER SERPL-CCNC: 48 U/L (ref 45–117)
ALT SERPL-CCNC: 25 U/L (ref 10–49)
ANION GAP SERPL CALC-SCNC: 6 MMOL/L (ref 0–18)
AST SERPL-CCNC: 26 U/L (ref ?–34)
BASOPHILS # BLD AUTO: 0.04 X10(3) UL (ref 0–0.2)
BASOPHILS NFR BLD AUTO: 0.8 %
BILIRUB SERPL-MCNC: 0.8 MG/DL (ref 0.3–1.2)
BUN BLD-MCNC: 12 MG/DL (ref 9–23)
CALCIUM BLD-MCNC: 9.6 MG/DL (ref 8.7–10.6)
CHLORIDE SERPL-SCNC: 104 MMOL/L (ref 98–112)
CHOLEST SERPL-MCNC: 159 MG/DL (ref ?–200)
CO2 SERPL-SCNC: 30 MMOL/L (ref 21–32)
COMPLEXED PSA SERPL-MCNC: 1.55 NG/ML (ref ?–4)
CREAT BLD-MCNC: 1.2 MG/DL (ref 0.7–1.3)
EGFRCR SERPLBLD CKD-EPI 2021: 72 ML/MIN/1.73M2 (ref 60–?)
EOSINOPHIL # BLD AUTO: 0.26 X10(3) UL (ref 0–0.7)
EOSINOPHIL NFR BLD AUTO: 5.2 %
ERYTHROCYTE [DISTWIDTH] IN BLOOD BY AUTOMATED COUNT: 13.1 %
FASTING PATIENT LIPID ANSWER: YES
FASTING STATUS PATIENT QL REPORTED: YES
GLOBULIN PLAS-MCNC: 2.4 G/DL (ref 2–3.5)
GLUCOSE BLD-MCNC: 99 MG/DL (ref 70–99)
HCT VFR BLD AUTO: 44.6 % (ref 39–53)
HDLC SERPL-MCNC: 58 MG/DL (ref 40–59)
HGB BLD-MCNC: 15.2 G/DL (ref 13–17.5)
IMM GRANULOCYTES # BLD AUTO: 0.01 X10(3) UL (ref 0–1)
IMM GRANULOCYTES NFR BLD: 0.2 %
LDLC SERPL CALC-MCNC: 83 MG/DL (ref ?–100)
LYMPHOCYTES # BLD AUTO: 1.18 X10(3) UL (ref 1–4)
LYMPHOCYTES NFR BLD AUTO: 23.6 %
MCH RBC QN AUTO: 31.9 PG (ref 26–34)
MCHC RBC AUTO-ENTMCNC: 34.1 G/DL (ref 31–37)
MCV RBC AUTO: 93.5 FL (ref 80–100)
MONOCYTES # BLD AUTO: 0.38 X10(3) UL (ref 0.1–1)
MONOCYTES NFR BLD AUTO: 7.6 %
NEUTROPHILS # BLD AUTO: 3.14 X10 (3) UL (ref 1.5–7.7)
NEUTROPHILS # BLD AUTO: 3.14 X10(3) UL (ref 1.5–7.7)
NEUTROPHILS NFR BLD AUTO: 62.6 %
NONHDLC SERPL-MCNC: 101 MG/DL (ref ?–130)
OSMOLALITY SERPL CALC.SUM OF ELEC: 290 MOSM/KG (ref 275–295)
PLATELET # BLD AUTO: 238 10(3)UL (ref 150–450)
POTASSIUM SERPL-SCNC: 5.3 MMOL/L (ref 3.5–5.1)
PROT SERPL-MCNC: 7.1 G/DL (ref 5.7–8.2)
RBC # BLD AUTO: 4.77 X10(6)UL (ref 4.3–5.7)
SODIUM SERPL-SCNC: 140 MMOL/L (ref 136–145)
TRIGL SERPL-MCNC: 101 MG/DL (ref 30–149)
TSI SER-ACNC: 1.76 UIU/ML (ref 0.55–4.78)
VLDLC SERPL CALC-MCNC: 16 MG/DL (ref 0–30)
WBC # BLD AUTO: 5 X10(3) UL (ref 4–11)

## 2025-05-13 PROCEDURE — 80061 LIPID PANEL: CPT

## 2025-05-13 PROCEDURE — 36415 COLL VENOUS BLD VENIPUNCTURE: CPT

## 2025-05-13 PROCEDURE — 85025 COMPLETE CBC W/AUTO DIFF WBC: CPT

## 2025-05-13 PROCEDURE — 84443 ASSAY THYROID STIM HORMONE: CPT

## 2025-05-13 PROCEDURE — 80053 COMPREHEN METABOLIC PANEL: CPT

## 2025-05-15 ENCOUNTER — OFFICE VISIT (OUTPATIENT)
Age: 55
End: 2025-05-15
Payer: COMMERCIAL

## 2025-05-15 VITALS
SYSTOLIC BLOOD PRESSURE: 124 MMHG | HEIGHT: 69.29 IN | BODY MASS INDEX: 34.09 KG/M2 | HEART RATE: 74 BPM | OXYGEN SATURATION: 98 % | RESPIRATION RATE: 18 BRPM | TEMPERATURE: 99 F | WEIGHT: 232.81 LBS | DIASTOLIC BLOOD PRESSURE: 86 MMHG

## 2025-05-15 DIAGNOSIS — R53.82 CHRONIC FATIGUE: ICD-10-CM

## 2025-05-15 DIAGNOSIS — E55.9 VITAMIN D DEFICIENCY: ICD-10-CM

## 2025-05-15 DIAGNOSIS — R93.1 AGATSTON CORONARY ARTERY CALCIUM SCORE LESS THAN 100: ICD-10-CM

## 2025-05-15 DIAGNOSIS — E78.00 HYPERCHOLESTEROLEMIA: ICD-10-CM

## 2025-05-15 DIAGNOSIS — F41.9 ANXIETY: ICD-10-CM

## 2025-05-15 DIAGNOSIS — G47.33 OSA (OBSTRUCTIVE SLEEP APNEA): ICD-10-CM

## 2025-05-15 DIAGNOSIS — Z00.00 WELLNESS EXAMINATION: Primary | ICD-10-CM

## 2025-05-15 DIAGNOSIS — E87.5 HYPERKALEMIA: ICD-10-CM

## 2025-05-15 DIAGNOSIS — R91.8 PULMONARY NODULES: ICD-10-CM

## 2025-05-15 DIAGNOSIS — Z98.890 S/P SURGERY ON NASAL SEPTUM: ICD-10-CM

## 2025-05-15 PROBLEM — G47.10 HYPERSOMNIA: Status: ACTIVE | Noted: 2018-02-19

## 2025-05-15 PROCEDURE — 90677 PCV20 VACCINE IM: CPT | Performed by: FAMILY MEDICINE

## 2025-05-15 PROCEDURE — 99396 PREV VISIT EST AGE 40-64: CPT | Performed by: FAMILY MEDICINE

## 2025-05-15 PROCEDURE — 90471 IMMUNIZATION ADMIN: CPT | Performed by: FAMILY MEDICINE

## 2025-05-15 RX ORDER — DULOXETIN HYDROCHLORIDE 30 MG/1
30 CAPSULE, DELAYED RELEASE ORAL DAILY
Qty: 30 CAPSULE | Refills: 0 | Status: SHIPPED | OUTPATIENT
Start: 2025-05-15

## 2025-05-15 NOTE — PROGRESS NOTES
Jean Pierre Toro  8/21/1970    Chief Complaint   Patient presents with    Physical    Follow - Up     Follow up labs and nodes on lungs        HPI:   Jean Pierre Toro is a 54 year old male who presents for CPE. Did well after his nasal surgery. Has had evaluation with new sleep specialist and will try oral appliance fist. He feels his anxiety is controlled and wants to wean off his Cymbalta.     Current Medications[1]   Allergies[2]   Past Medical History[3]   Problem List[4]   Past Surgical History[5]   Family History[6]   Short Social Hx on File[7]      REVIEW OF SYSTEMS:   GENERAL: feels well otherwise  SKIN: no rashes  EYES: no new vision changes  HEENT: not congested  LUNGS: no new dyspnea  CARDIOVASCULAR: no new chest pain  GI: no new abdominal pain  NEURO: no headaches    EXAM:   /86   Pulse 74   Temp 98.5 °F (36.9 °C)   Resp 18   Ht 5' 9.29\" (1.76 m)   Wt 232 lb 12.8 oz (105.6 kg)   SpO2 98%   BMI 34.09 kg/m²   GENERAL: Well developed, well nourished,in no apparent distress  SKIN: No rashes,no suspicious lesions  EYES: PERRLA, EOMI, conjunctiva are clear  HEENT: atraumatic, normocephalic,ears and throat are clear  NECK: supple,no adenopathy,no bruits  LUNGS: clear to auscultation  CARDIO: RRR without murmur  GI: good BS's,no masses, HSM or tenderness    ASSESSMENT AND PLAN:   Jean Pierre Toro is a 54 year old male who presents for CPE    Wellness examination  Discussed age appropriate health and wellness. Encouraged continued emphasis on healthy low processed food diet, exercise goals, restorative sleep and stress mitigation.   - Prevnar 20 (PCV20) [10182]    S/P surgery on nasal septum  Overall doing well after his surgery.    Hypercholesterolemia  Agatston coronary artery calcium score less than 100  LDL at goal on current statin.  His calcium score on his recent heart scan was mildly elevated at 37.  We will continue statin therapy and risk factor optimization.    Anxiety  He feels his  symptoms are currently well-controlled and is interested in tapering off duloxetine.  Will reduce dose to 30 mg daily for the next 2 weeks and he will update me with how he is feeling.  If symptoms are stable, we will continue to taper at that time  - DULoxetine 30 MG Oral Cap DR Particles; Take 1 capsule (30 mg total) by mouth daily.  Dispense: 30 capsule; Refill: 0    MARTA (obstructive sleep apnea)  Chronic fatigue  Vitamin D deficiency  Will continue current vitamin D dosing of 2000 international units daily and recheck vitamin D level.  We will also check his vitamin B12 level.  His fatigue is likely heavily related to his currently untreated sleep apnea.  He will be starting a trial of oral appliance therapy and is following with an outside sleep specialist  - Vitamin D [E]; Future  - Vitamin B12 [E]; Future    Pulmonary nodules  Plan repeat LDCT to monitor for stability in April 2026.  - CT CHEST LD NO CAD(CPT=71250); Future    Hyperkalemia  Repeat well hydrated nonfasting potassium level within the week.  - Potassium [E]; Future    All questions were answered and the patient agrees with the plan.     Thank you,  Jean Pierre Lord MD         [1]   Current Outpatient Medications   Medication Sig Dispense Refill    rosuvastatin 5 MG Oral Tab Take 1 tablet (5 mg total) by mouth nightly. 90 tablet 3    DULoxetine 60 MG Oral Cap DR Particles Take 1 capsule (60 mg total) by mouth daily. 90 capsule 3    ergocalciferol 1.25 MG (78760 UT) Oral Cap Take 1 capsule (50,000 Units total) by mouth once a week. (Patient taking differently: Take 2,000 Units by mouth daily.) 12 capsule 0    Albuterol Sulfate (PROAIR RESPICLICK) 108 (90 Base) MCG/ACT Inhalation Aerosol Powder, Breath Activated Inhale 2 puffs into the lungs every 4 to 6 hours as needed. 1 each 5   [2]   Allergies  Allergen Reactions    Seasonal Runny nose    Uncaria Tomentosa, Cats Claw ITCHING   [3]   Past Medical History:   Chronic pansinusitis    Esophageal reflux     Gastroesophageal reflux disease without esophagitis    History of colon polyps    on 4 colonoscopies    Hypercholesterolemia    Obstructive apnea    [facility] [place] HST AHI 6 supine  AHI 22 SaO2 moise 85 %    Sleep apnea    Venous insufficiency of left lower extremity, deep on U/S   [4]   Patient Active Problem List  Diagnosis    Obesity (BMI 30-39.9)    Hypercholesterolemia    Gastroesophageal reflux disease without esophagitis    MARTA (obstructive sleep apnea), PSG 12/2019    Anxiety    Chronic pansinusitis    Venous insufficiency of left lower extremity, deep on U/S    Agatston coronary artery calcium score less than 100    Hypersomnia   [5]   Past Surgical History:  Procedure Laterality Date    Colonoscopy  2018    +polyps    Egd N/A 12/15/2021    Procedure: ESOPHAGOGASTRODUODENOSCOPY, COLONOSCOPY,;  Surgeon: Vinicio Victoria MD;  Location: [facility] [place] [facility], [facility]   [6]   Family History  Problem Relation Age of Onset    Other (brain aneurysm) Mother     Dementia Mother     Stroke Mother     Neurological Disorder Father     Alcohol abuse Father     Colon Cancer Maternal Grandfather     Colon Cancer Maternal Aunt 36    Other (brain aneurysm) Maternal Aunt    [7]   Social History  Socioeconomic History    Marital status: Single   Tobacco Use    Smoking status: Never    Smokeless tobacco: Never   Vaping Use    Vaping status: Never Used   Substance and Sexual Activity    Alcohol use: Yes     Alcohol/week: 3.0 - 4.0 standard drinks of alcohol     Types: 3 - 4 Cans of beer per week    Drug use: Yes     Frequency: 2.0 times per week     Types: Cannabis

## 2025-05-29 ENCOUNTER — LAB ENCOUNTER (OUTPATIENT)
Dept: LAB | Age: 55
End: 2025-05-29
Attending: FAMILY MEDICINE
Payer: COMMERCIAL

## 2025-05-29 DIAGNOSIS — E87.5 HYPERKALEMIA: ICD-10-CM

## 2025-05-29 DIAGNOSIS — R53.82 CHRONIC FATIGUE: ICD-10-CM

## 2025-05-29 LAB
POTASSIUM SERPL-SCNC: 4.4 MMOL/L (ref 3.5–5.1)
VIT B12 SERPL-MCNC: 708 PG/ML (ref 211–911)
VIT D+METAB SERPL-MCNC: 23.3 NG/ML (ref 30–100)

## 2025-05-29 PROCEDURE — 82306 VITAMIN D 25 HYDROXY: CPT

## 2025-05-29 PROCEDURE — 36415 COLL VENOUS BLD VENIPUNCTURE: CPT

## 2025-05-29 PROCEDURE — 82607 VITAMIN B-12: CPT

## 2025-05-29 PROCEDURE — 84132 ASSAY OF SERUM POTASSIUM: CPT

## 2025-06-16 ENCOUNTER — PATIENT MESSAGE (OUTPATIENT)
Age: 55
End: 2025-06-16

## 2025-06-16 DIAGNOSIS — F41.9 ANXIETY: ICD-10-CM

## 2025-06-17 DIAGNOSIS — F41.9 ANXIETY: ICD-10-CM

## 2025-06-17 RX ORDER — DULOXETIN HYDROCHLORIDE 30 MG/1
30 CAPSULE, DELAYED RELEASE ORAL DAILY
Qty: 30 CAPSULE | Refills: 0 | Status: SHIPPED | OUTPATIENT
Start: 2025-06-17

## 2025-06-18 RX ORDER — DULOXETIN HYDROCHLORIDE 30 MG/1
30 CAPSULE, DELAYED RELEASE ORAL DAILY
Qty: 30 CAPSULE | Refills: 0 | OUTPATIENT
Start: 2025-06-18

## 2025-07-01 DIAGNOSIS — F41.9 ANXIETY: ICD-10-CM

## 2025-07-04 RX ORDER — DULOXETIN HYDROCHLORIDE 60 MG/1
60 CAPSULE, DELAYED RELEASE ORAL DAILY
Qty: 90 CAPSULE | Refills: 3 | OUTPATIENT
Start: 2025-07-04

## 2025-07-04 NOTE — TELEPHONE ENCOUNTER
Requested Prescriptions     Pending Prescriptions Disp Refills    DULOXETINE 60 MG Oral Cap DR Particles [Pharmacy Med Name: DULOXETINE HCL DR CAPS 60MG] 90 capsule 3     Sig: TAKE 1 CAPSULE DAILY     The original prescription was discontinued on 5/15/2025 by Jean Pierre Lord MD for the following reason: Dose adjustment.

## 2025-07-16 DIAGNOSIS — F41.9 ANXIETY: ICD-10-CM

## 2025-07-21 RX ORDER — DULOXETIN HYDROCHLORIDE 30 MG/1
30 CAPSULE, DELAYED RELEASE ORAL DAILY
Qty: 90 CAPSULE | Refills: 3 | Status: SHIPPED | OUTPATIENT
Start: 2025-07-21

## 2025-08-04 ENCOUNTER — PATIENT MESSAGE (OUTPATIENT)
Age: 55
End: 2025-08-04

## 2025-08-07 ENCOUNTER — OFFICE VISIT (OUTPATIENT)
Age: 55
End: 2025-08-07

## 2025-08-07 VITALS
HEIGHT: 69.29 IN | HEART RATE: 86 BPM | RESPIRATION RATE: 16 BRPM | SYSTOLIC BLOOD PRESSURE: 126 MMHG | WEIGHT: 235 LBS | BODY MASS INDEX: 34.41 KG/M2 | DIASTOLIC BLOOD PRESSURE: 80 MMHG | OXYGEN SATURATION: 98 %

## 2025-08-07 DIAGNOSIS — L53.8 MACULAR ERYTHEMATOUS RASH: Primary | ICD-10-CM

## 2025-08-07 PROCEDURE — 99213 OFFICE O/P EST LOW 20 MIN: CPT | Performed by: FAMILY MEDICINE

## 2025-08-07 RX ORDER — METHYLPREDNISOLONE 4 MG/1
TABLET ORAL
Qty: 1 EACH | Refills: 0 | Status: SHIPPED | OUTPATIENT
Start: 2025-08-07

## 2025-08-07 RX ORDER — TOBRAMYCIN AND DEXAMETHASONE 3; 1 MG/ML; MG/ML
SUSPENSION/ DROPS OPHTHALMIC
COMMUNITY
Start: 2025-07-25

## 2025-08-07 RX ORDER — FEXOFENADINE HCL 180 MG/1
180 TABLET ORAL DAILY
Qty: 30 TABLET | Refills: 0 | Status: SHIPPED | OUTPATIENT
Start: 2025-08-07

## 2025-08-07 RX ORDER — OLOPATADINE HYDROCHLORIDE 1 MG/ML
1 SOLUTION OPHTHALMIC 2 TIMES DAILY
COMMUNITY
Start: 2025-07-18

## (undated) NOTE — LETTER
11/14/2024    Jean Pierre Toro  [address] [place] [address]    Dear Jean Pierre,    We would like to inform you that your account has been charged $40 for not showing up to the office for your scheduled appointment on 11/14/20024.    Our no-show policy is as follows: A 24-hour notice is required, or you may be charged a $40 No Show fee.      If you are unable to keep your scheduled appointment, please notify us at least 24 hours in advance so we can accommodate our other patients. You may also reschedule your appointment at that time.    On the third no-show, within a 12-month period, it will be the physician’s discretion as to whether a discharge letter will be sent out disengaging you from the practice and giving you 30 days to enroll with a new non [facility] physician.    If you would like to contest this charge, please call [phone].    Sincerely,  [facility]

## (undated) NOTE — ED AVS SNAPSHOT
Poly Marcial   MRN: [id number]    Department:  [facility] in [place]   Date of Visit:  9/3/2018           Disclosure     Insurance plans vary and the physician(s) referred by the ER may not be covered by your plan.  Please contact tell this physician (or your personal doctor if your instructions are to return to your personal doctor) about any new or lasting problems. The primary care or specialist physician will see patients referred from the [facility] Emergency Department.  Stefano Alicea